# Patient Record
Sex: MALE | Race: WHITE
[De-identification: names, ages, dates, MRNs, and addresses within clinical notes are randomized per-mention and may not be internally consistent; named-entity substitution may affect disease eponyms.]

---

## 2017-12-21 ENCOUNTER — HOSPITAL ENCOUNTER (OUTPATIENT)
Dept: HOSPITAL 80 - SBRMNEURO | Age: 67
End: 2017-12-21
Attending: INTERNAL MEDICINE
Payer: COMMERCIAL

## 2017-12-21 DIAGNOSIS — G47.31: Primary | ICD-10-CM

## 2017-12-21 DIAGNOSIS — G47.61: ICD-10-CM

## 2017-12-21 DIAGNOSIS — G47.33: ICD-10-CM

## 2018-05-24 ENCOUNTER — HOSPITAL ENCOUNTER (OUTPATIENT)
Dept: HOSPITAL 80 - BHFA | Age: 68
End: 2018-05-24
Attending: INTERNAL MEDICINE
Payer: COMMERCIAL

## 2018-05-24 DIAGNOSIS — I48.91: Primary | ICD-10-CM

## 2019-03-11 ENCOUNTER — HOSPITAL ENCOUNTER (OUTPATIENT)
Dept: HOSPITAL 80 - BHFA | Age: 69
End: 2019-03-11
Attending: INTERNAL MEDICINE
Payer: COMMERCIAL

## 2019-03-11 DIAGNOSIS — I25.10: ICD-10-CM

## 2019-03-11 DIAGNOSIS — G47.33: ICD-10-CM

## 2019-03-11 DIAGNOSIS — I48.91: Primary | ICD-10-CM

## 2019-03-11 DIAGNOSIS — I34.0: ICD-10-CM

## 2019-03-25 ENCOUNTER — HOSPITAL ENCOUNTER (OUTPATIENT)
Dept: HOSPITAL 80 - BHFA | Age: 69
End: 2019-03-25
Attending: INTERNAL MEDICINE
Payer: COMMERCIAL

## 2019-03-25 DIAGNOSIS — I48.91: Primary | ICD-10-CM

## 2019-04-02 ENCOUNTER — HOSPITAL ENCOUNTER (OUTPATIENT)
Dept: HOSPITAL 80 - FCATH | Age: 69
Discharge: HOME | End: 2019-04-02
Attending: INTERNAL MEDICINE
Payer: COMMERCIAL

## 2019-04-02 DIAGNOSIS — F52.21: ICD-10-CM

## 2019-04-02 DIAGNOSIS — E78.5: ICD-10-CM

## 2019-04-02 DIAGNOSIS — I34.0: ICD-10-CM

## 2019-04-02 DIAGNOSIS — I25.10: ICD-10-CM

## 2019-04-02 DIAGNOSIS — I10: ICD-10-CM

## 2019-04-02 DIAGNOSIS — G47.33: ICD-10-CM

## 2019-04-02 DIAGNOSIS — E11.9: ICD-10-CM

## 2019-04-02 DIAGNOSIS — I48.1: Primary | ICD-10-CM

## 2019-04-02 DIAGNOSIS — Z79.01: ICD-10-CM

## 2019-04-02 LAB
INR PPP: 1.18 (ref 0.83–1.16)
PROTHROMBIN TIME: 14.5 SEC (ref 12–15)

## 2019-04-02 NOTE — PDANEPAE
ANE History of Present Illness





afib








ANE Past Medical History





- Cardiovascular History


Hx Hypertension: Yes


Hx Arrhythmias: Yes


Hx Chest Pain: No


Hx Coronary Artery / Peripheral Vascular Disease: Yes


Hx CHF / Valvular Disease: No


Hx Palpitations: No





- Pulmonary History


Hx COPD: No


Hx Asthma/Reactive Airway Disease: No


Hx Recent Upper Respiratory Infection: No


Hx Oxygen in Use at Home: No


Hx Sleep Apnea: No





- Neurologic History


Hx Cerebrovascular Accident: No


Hx Seizures: No


Hx Dementia: No





- Endocrine History


Hx Diabetes: No


Hypothyroid: No


Hyperthyroid: No


Obesity: no





- Renal History


Hx Renal Disorders: No





- Liver History


Hx Hepatic Disorders: No





ANE Review of Systems


Review of Systems: 








- Exercise capacity


Exercise capacity: >=4 METS





ANE Patient History





- Allergies


Allergies/Adverse Reactions: 








No Known Allergies Allergy (Unverified 04/02/19 11:20)


 








- Home Medications


Home Medications: 








Amiodarone HCl 200 mg PO DAILY 04/02/19 [Last Taken 04/01/19 20:00]


Cartia XT 240mg 240 mg PO DAILY 04/02/19 [Last Taken 04/01/19 20:00]


Eliquis 5 mg PO BID 04/02/19 [Last Taken 04/02/19 07:00]


Fish Oil 1,000 mg Softgel 1 PO DAILY 04/02/19 [Last Taken 04/01/19 08:00]


Modafinil 200 mg PO PRN 04/02/19 [Last Taken 02/02/19 08:00]


Ramipril 5 mg PO 04/02/19 [Last Taken 04/01/19 20:00]


Sildenafil Citrate 50 mg PO PRN 04/02/19 [Last Taken 03/19/19 20:00]


Testosterone Cypionate 200 mg IM DAILY 04/02/19 [Last Taken 03/31/19 08:00]


Vitamin D3 2,000 unit PO DAILY 04/02/19 [Last Taken 04/01/19 08:00]








- Smoking Hx


Smoking Status: Never smoked





ANE Labs/Vital Signs





- Labs


Result Diagrams: 


 04/02/19 11:03





- Vital Signs


Height: 188 cm


Weight: 104.3 kg





ANE Physical Exam





- Airway


Mallampati Score: Class 2


Mouth exam: normal dental/mouth exam





- Pulmonary


Pulmonary: no respiratory distress





- Cardiovascular


Cardiovascular: irregularly irregular





- ASA Status


ASA Status: III





ANE Anesthesia Plan


Anesthesia Plan: GA with mask

## 2019-04-02 NOTE — PDTEE1
NIDIA Cardioversion Procedure


Procedure: electrical cardioversion, transesophageal echo


Indications: atrial fibrillation


Consent: signed and in chart


Anticoagulation: eliquis


Procedural Details: 


Pads were placed in anterior-posterior position.  NIDIA probe was advanced and 

standard images obtained.  There is no evidence of left atrial or left atrial 

appendage thrombus.





Synchronized cardioversion attempt #1: 200J


Results: normal sinus rhythm


Conclusions: successful NIDIA cardioversion (moderate MR - 2 jets - anterior and 

posterior noted on NIDIA.  d.w. patient and sister that he needs to stop drinking 

ETOH completely)

## 2019-04-02 NOTE — PDGENHP
History & Physical


Chief Complaint: symptomatic afib


Relevant Physical Exam: [id number] irreg cta [id number]


Cardiorespiratory Assessment: for linsey and cv

## 2019-04-03 NOTE — CPEKG
Test Reason : OPEN

Blood Pressure : ***/*** mmHG

Vent. Rate : 067 BPM     Atrial Rate : 066 BPM

   P-R Int : 235 ms          QRS Dur : 098 ms

    QT Int : 450 ms       P-R-T Axes : 057 -12 063 degrees

   QTc Int : 475 ms

 

Sinus rhythm

First degree AV block

Borderline prolonged QT interval

 

Confirmed by Vivek Nguyen ([id number]) on 4/3/2019 9:01:39 AM

 

Referred By: Antonio Herrmann           Confirmed By:Vivek Nguyen

## 2019-04-03 NOTE — CPEKG
Test Reason : OPEN

Blood Pressure : ***/*** mmHG

Vent. Rate : 080 BPM     Atrial Rate : 124 BPM

   P-R Int : 172 ms          QRS Dur : 094 ms

    QT Int : 404 ms       P-R-T Axes : 000 -01 093 degrees

   QTc Int : 466 ms

 

Atrial fibrillation

Borderline T wave abnormalities

Minimal ST elevation, inferior leads

 

Confirmed by Vivek Nguyen ([id number]) on 4/3/2019 9:00:27 AM

 

Referred By: Antonio Herrmann           Confirmed By:Vivek Nguyen

## 2019-04-05 ENCOUNTER — HOSPITAL ENCOUNTER (OUTPATIENT)
Dept: HOSPITAL 80 - BHFA | Age: 69
End: 2019-04-05
Attending: INTERNAL MEDICINE
Payer: COMMERCIAL

## 2019-04-05 DIAGNOSIS — I48.91: Primary | ICD-10-CM

## 2019-04-05 DIAGNOSIS — I34.0: ICD-10-CM

## 2019-04-05 DIAGNOSIS — I25.10: ICD-10-CM
